# Patient Record
Sex: FEMALE | Race: WHITE | NOT HISPANIC OR LATINO | URBAN - METROPOLITAN AREA
[De-identification: names, ages, dates, MRNs, and addresses within clinical notes are randomized per-mention and may not be internally consistent; named-entity substitution may affect disease eponyms.]

---

## 2022-01-14 ENCOUNTER — TELEPHONE (OUTPATIENT)
Dept: ORTHOPEDIC SURGERY | Facility: CLINIC | Age: 47
End: 2022-01-14

## 2022-01-14 NOTE — TELEPHONE ENCOUNTER
Provider: HARI AMOR    Caller: PATIENT    Relationship to Patient: SELF    Phone Number: [phone]    Reason for Call: PT. STATES THAT SHE SAW DR. NORIEGA ABOUT HER RIGHT HIP PAIN AROUND [id number].   SHE STATES THAT HE REFERRED HER TO A HIP SPECIALIST IN [place]- SHE THINKS HIS OFFICE WAS AT [facility], BUT SHE CANNOT REMEMBER THE NAME.   PT. CHECKING TO SEE IF DR. NORIEGA CAN LOOK BACK TO SEE WHO HE REFERRED TO.   PLEASE ADVISE.